# Patient Record
Sex: MALE | HISPANIC OR LATINO | URBAN - METROPOLITAN AREA
[De-identification: names, ages, dates, MRNs, and addresses within clinical notes are randomized per-mention and may not be internally consistent; named-entity substitution may affect disease eponyms.]

---

## 2019-04-06 ENCOUNTER — EMERGENCY (EMERGENCY)
Facility: HOSPITAL | Age: 44
LOS: 0 days | Discharge: HOME | End: 2019-04-06
Attending: EMERGENCY MEDICINE
Payer: OTHER MISCELLANEOUS

## 2019-04-06 VITALS
OXYGEN SATURATION: 97 % | HEIGHT: 66 IN | TEMPERATURE: 98 F | DIASTOLIC BLOOD PRESSURE: 88 MMHG | WEIGHT: 179.9 LBS | RESPIRATION RATE: 15 BRPM | SYSTOLIC BLOOD PRESSURE: 143 MMHG | HEART RATE: 107 BPM

## 2019-04-06 DIAGNOSIS — S61.214A LACERATION WITHOUT FOREIGN BODY OF RIGHT RING FINGER WITHOUT DAMAGE TO NAIL, INITIAL ENCOUNTER: ICD-10-CM
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
## 2019-04-06 PROCEDURE — 99284 EMERGENCY DEPT VISIT MOD MDM: CPT | Mod: 25

## 2019-04-06 PROCEDURE — 73130 X-RAY EXAM OF HAND: CPT | Mod: 26,RT

## 2019-04-06 RX ORDER — CEPHALEXIN 500 MG
1 CAPSULE ORAL
Qty: 28 | Refills: 0 | OUTPATIENT
Start: 2019-04-06

## 2019-04-06 RX ORDER — TETANUS TOXOID, REDUCED DIPHTHERIA TOXOID AND ACELLULAR PERTUSSIS VACCINE, ADSORBED 5; 2.5; 8; 8; 2.5 [IU]/.5ML; [IU]/.5ML; UG/.5ML; UG/.5ML; UG/.5ML
0.5 SUSPENSION INTRAMUSCULAR ONCE
Qty: 0 | Refills: 0 | Status: COMPLETED | OUTPATIENT
Start: 2019-04-06 | End: 2019-04-06

## 2019-04-06 RX ORDER — ACETAMINOPHEN 500 MG
975 TABLET ORAL ONCE
Qty: 0 | Refills: 0 | Status: COMPLETED | OUTPATIENT
Start: 2019-04-06 | End: 2019-04-06

## 2019-04-06 RX ADMIN — TETANUS TOXOID, REDUCED DIPHTHERIA TOXOID AND ACELLULAR PERTUSSIS VACCINE, ADSORBED 0.5 MILLILITER(S): 5; 2.5; 8; 8; 2.5 SUSPENSION INTRAMUSCULAR at 16:51

## 2019-04-06 RX ADMIN — Medication 975 MILLIGRAM(S): at 16:53

## 2019-04-06 NOTE — ED STATDOCS - NS ED ROS FT
Constitutional: No fever or chills  Eyes: No visual changes  HEENT: No throat pain  CV: No chest pain  Resp: No SOB no cough  GI: No abd pain, nausea or vomiting  : No dysuria  MSK: +right finger pain/amputation  Skin: No rash  Neuro: No headache

## 2019-04-06 NOTE — ED STATDOCS - NS_ ATTENDINGSCRIBEDETAILS _ED_A_ED_FT
I, Al Nickerson MD,  performed the initial face to face bedside interview with this patient regarding history of present illness, review of symptoms and relevant past medical, social and family history.  I completed an independent physical examination.  I was the initial provider who evaluated this patient.  The history, relevant review of systems, past medical and surgical history, medical decision making, and physical examination was documented by the scribe in my presence and I attest to the accuracy of the documentation.

## 2019-04-06 NOTE — ED STATDOCS - PROGRESS NOTE DETAILS
LAC repaired by Ortho resident. Patient to get skin flap electively. ED evaluation and management discussed with the patient and family (if available) in detail.  Close PMD follow up encouraged.  Strict ED return instructions discussed in detail and patient given the opportunity to ask any questions about their discharge diagnosis and instructions. Patient verbalized understanding.

## 2019-04-06 NOTE — CONSULT NOTE ADULT - SUBJECTIVE AND OBJECTIVE BOX
42 y/o male with no relevant PMHx presents to the ED c/o 4th right finger injury. Pt reports he was lifting a treadmill into a trailer around 3:20pm today when the treadmill landed on his finger, sustaining an amputation of digit. Did not take any pain meds. Non smoker. NKDA. Right hand dominant. curenttly in 8/10 pain in ED. Pt is allowing friend to translate.    PMH: Denies    PSH: Denies    AH: NKDA    Meds: See med rec    T(C): 36.9 (04-06-19 @ 16:12)  HR: 107 (04-06-19 @ 16:12)  BP: 143/88 (04-06-19 @ 16:12)  RR: 15 (04-06-19 @ 16:12)  SpO2: 97% (04-06-19 @ 16:12)  Wt(kg): --    PE RUE:  Avulsion of distal pulp of 4th digit, no visble bone, sensation intact on volar and ulnar aspects proximal to avulsion, can flex PIP and DIP, SILT C5-T1, +AIN/PIN/Ulnar/Radial/Musc/Median, +shoulder/elbow ROM, wrist ROM limited 2/2 pain, +radial pulse, soft compartments, no calf ttp.    Secondary:  No TTP over bony landmarks, SILT BL, ROM intact BL, distal pulses palpable.    Imaging:  XR demonstrating no evidence of fracture or dislocation

## 2019-04-06 NOTE — ED STATDOCS - CLINICAL SUMMARY MEDICAL DECISION MAKING FREE TEXT BOX
44 y/o male presents to the ED with right 4th digit distal finger amputation, here pt with avulsion of nail and amputation of pulp of right 4th digit, NVI, will obtain xray, update Tetanus, consult hand surgery and reassess.

## 2019-04-06 NOTE — ED STATDOCS - CARE PROVIDER_API CALL
Magy Frye)  Orthopaedic Surgery; Surgery of the Hand  [address] [place] [address]  Phone: [phone]  Fax: [phone]  Follow Up Time:

## 2019-04-06 NOTE — ED STATDOCS - OBJECTIVE STATEMENT
44 y/o male with no relevant PMHx presents to the ED c/o 4th right finger injury. Pt reports he was lifting a treadmill into a trailer around 3:20pm today when the treadmill landed on his finger, sustaining a partial amputation. Did not take any pain meds. Non smoker. NKDA. Right hand dominant. curenttly in 8/10 pain in ED. Pt is allowing friend to translate. 44 y/o male with no relevant PMHx presents to the ED c/o 4th right finger injury. Pt reports he was lifting a treadmill into a trailer around 3:20pm today when the treadmill landed on his finger, sustaining an amputation of digit. Did not take any pain meds. Non smoker. NKDA. Right hand dominant. curenttly in 8/10 pain in ED. Pt is allowing friend to translate.

## 2019-04-06 NOTE — ED STATDOCS - PHYSICAL EXAMINATION
Constitutional: mild distress AAOx3  Eyes: PERRLA EOMI  Head: Normocephalic atraumatic  Mouth: MMM  Cardiac: regular rate. normal peripheral pulses  Resp: Lungs CTAB  GI: Abd s/nt/nd  Neuro: CN2-12 intact.   Skin: No rashes. +amputation to the distal portion of right 4th digit, avulsed nail, warm, well perfused, extremity with normal sensation, normal flexor and extensor function. Constitutional: mild distress AAOx3  Eyes: PERRLA EOMI  Head: Normocephalic atraumatic  Mouth: MMM  Cardiac: tachycardic. normal peripheral pulses  Resp: Lungs CTAB  GI: Abd s/nt/nd  Neuro: CN2-12 intact.   Skin: No rashes. +amputation to the distal portion of right 4th digit, avulsed nail, warm, well perfused, extremity with normal sensation, normal flexor and extensor function. Constitutional: mild distress AAOx3  Eyes: PERRLA EOMI  Head: Normocephalic atraumatic  Mouth: MMM  Cardiac: tachycardic. normal peripheral pulses  Resp: Lungs CTAB  GI: Abd s/nt/nd  Neuro: CN2-12 intact.   Skin: No rashes. +amputation to the distal portion of right 4th digit, avulsed nail, warm, well perfused, extremity with normal sensation, normal flexor and extensor function.      GEN: AOX3, NAD. HEENT: Throat clear. Head NC/AT. NECK: Supple, No JVD. FROM. C-spine non-tender. CV:S1S2, RRR, LUNGS: CTA b/l, no w/r/r. ABD: Soft, NT/ND, no rebound, no guarding. No CVAT. EXT: No e/c/c. 2+ distal pulses. SKIN: No rashes. RIGHT RING FINGER: +partial amputation NEURO: No focal deficits. CN II-XII intact. FROM. 5/5 motor and sensory. LORAINE Garnica Constitutional: mild distress AAOx3  Eyes: PERRLA EOMI  Head: Normocephalic atraumatic  Mouth: MMM  Cardiac: tachycardic. normal peripheral pulses  Resp: Lungs CTAB  GI: Abd s/nt/nd  Neuro: CN2-12 intact.   Skin: No rashes. +amputation to the distal portion of right 4th digit, avulsed nail, warm, well perfused, extremity with normal sensation, normal flexor and extensor function.      GEN: AOX3, NAD. HEENT: Throat clear. Head NC/AT. NECK: Supple, No JVD. FROM. C-spine non-tender. CV:S1S2, RRR, LUNGS: CTA b/l, no w/r/r. ABD: Soft, NT/ND, no rebound, no guarding. No CVAT. EXT: No e/c/c. 2+ distal pulses. SKIN: No rashes. RIGHT RING FINGER: +partial amputation noted distal phalanx of right 4th digit. Mild bleeding. Full nail avulsion. ext/flex fn wnl. cap refill less than 2 sec.  NEURO: No focal deficits. CN II-XII intact. FROM. 5/5 motor and sensory. LORAINE Garnica